# Patient Record
Sex: MALE
[De-identification: names, ages, dates, MRNs, and addresses within clinical notes are randomized per-mention and may not be internally consistent; named-entity substitution may affect disease eponyms.]

---

## 2019-01-10 ENCOUNTER — HOSPITAL ENCOUNTER (EMERGENCY)
Dept: HOSPITAL 97 - ER | Age: 29
Discharge: HOME | End: 2019-01-10
Payer: SELF-PAY

## 2019-01-10 DIAGNOSIS — Z72.0: ICD-10-CM

## 2019-01-10 DIAGNOSIS — K02.7: Primary | ICD-10-CM

## 2019-01-10 PROCEDURE — 99283 EMERGENCY DEPT VISIT LOW MDM: CPT

## 2019-01-10 PROCEDURE — 96372 THER/PROPH/DIAG INJ SC/IM: CPT

## 2019-01-10 NOTE — ER
Nurse's Notes                                                                                     

 [facility]                                                                

Name: Antonio Grijalva Jr                                                                                

Age: 28 yrs                                                                                       

Sex: Male                                                                                         

: 1990                                                                                   

MRN: [id number]                                                                                   

Arrival Date: 01/10/2019                                                                          

Time: 11:37                                                                                       

Account#: [id number]                                                                            

Bed Treatment                                                                                     

Private MD: Cally Cuenca                                                                    

Diagnosis: Dental caries;Dental root caries                                                       

                                                                                                  

Presentation:                                                                                     

01/10                                                                                             

12:07 Presenting complaint: Patient states: pain to left upper jaw earlier this week, face    iw  

      now swollen since last night. Transition of care: patient was not received from another     

      setting of care. Onset of symptoms was January 10, 2019. Risk Assessment: Do you want       

      to hurt yourself or someone else? Patient reports no desire to harm self or others.         

      Initial Sepsis Screen: Does the patient meet any 2 criteria? No. Patient's initial          

      sepsis screen is negative. Does the patient have a suspected source of infection? No.       

      Patient's initial sepsis screen is negative. Care prior to arrival: None.                   

12:07 Method Of Arrival: Ambulatory                                                           iw  

12:07 Acuity: KORIN 4                                                                           iw  

                                                                                                  

Triage Assessment:                                                                                

12:40 General: Appears in no apparent distress. comfortable.                                  iw  

13:20 General: Behavior is calm, cooperative.                                                 iw  

                                                                                                  

Historical:                                                                                       

- Allergies:                                                                                      

12:09 No Known Allergies;                                                                     iw  

- Home Meds:                                                                                      

12:09 None [Active];                                                                          iw  

- PMHx:                                                                                           

12:09 None;                                                                                   iw  

- PSHx:                                                                                           

12:09 None;                                                                                   iw  

                                                                                                  

- Immunization history:: Adult Immunizations not up to date.                                      

- Social history:: Smoking status: Patient uses tobacco products, cigars.                         

- Ebola Screening: : Patient negative for fever greater than or equal to 101.5 degrees            

  Fahrenheit, and additional compatible Ebola Virus Disease symptoms Patient denies               

  exposure to infectious person Patient denies travel to an Ebola-affected area in the            

  21 days before illness onset No symptoms or risks identified at this time.                      

                                                                                                  

                                                                                                  

Screenin:40 Fall Risk None identified.                                                              iw  

12:50 Nutritional screening: No deficits noted.                                               iw  

12:50 Tuberculosis screening: No symptoms or risk factors identified.                         iw  

13:20 Abuse screen: Denies threats or abuse. Denies injuries from another.                    iw  

                                                                                                  

Assessment:                                                                                       

12:40 General: Appears in no apparent distress. Behavior is calm, cooperative. Pain:          iw  

      Complains of pain in face and upper left first bicuspid and mouth and left cheek.           

      Neuro: Level of Consciousness is awake, alert, obeys commands, Moves all extremities.       

      Full function. Cardiovascular: Capillary refill < 3 seconds in bilateral fingers            

      Patient's skin is warm and dry. Respiratory: Respiratory effort is even, unlabored.         

      Derm: Skin is intact, is healthy with good turgor. Musculoskeletal: Range of motion:        

      intact in all extremities.                                                                  

                                                                                                  

Vital Signs:                                                                                      

12:10  / 71; Pulse 74; Resp 16; Pulse Ox 99% on R/A; Weight 106.59 kg; Height 5 ft. 8   iw  

      in. (172.72 cm); Pain 10/10;                                                                

12:10 Body Mass Index 35.73 (106.59 kg, 172.72 cm)                                            iw  

                                                                                                  

ED Course:                                                                                        

11:37 Patient arrived in ED.                                                                  mr  

11:37 Cally Cuenca is Private Physician.                                                mr  

11:49 Patient's name was called from ER lobby. No response.                                   sg  

12:01 Patient's name was called from ER lobby. No response. Unable to locate patient. Will    sg  

      disposition as left without being seen by a provider.                                       

12:08 Triage completed.                                                                       iw  

12:10 Kayleen Pena, RN is Primary Nurse.                                                   iw  

12:12 Marcio Monge MD is Attending Physician.                                             lucian 

12:40 Arm band placed on.                                                                     iw  

12:40 Patient has correct armband on for positive identification.                             iw  

13:00 Cally Cuenca is Referral Physician.                                               lucian 

13:01 Dank Moss DDS is Referral Physician.                                             lucian 

13:46 Patient did not have IV access during this emergency room visit.                        iw  

13:48 No provider procedures requiring assistance completed.                                  iw  

                                                                                                  

Administered Medications:                                                                         

13:30 Drug: Clindamycin 600 mg Route: IM; Site: left deltoid;                                 iw  

13:50 Follow up: Response: No adverse reaction                                                iw  

13:40 Drug: Clindamycin 300 mg Route: PO;                                                     iw  

13:50 Follow up: Response: No adverse reaction                                                iw  

13:48 Drug: Motrin 800 mg Route: PO;                                                          iw  

13:58 Follow up: Response: No adverse reaction                                                iw  

                                                                                                  

                                                                                                  

Outcome:                                                                                          

13:02 Discharge ordered by MD.                                                                lucian 

13:47 Discharged to home ambulatory, with family.                                             iw  

13:47 Condition: good                                                                             

13:47 Discharge instructions given to patient, family, Instructed on discharge instructions,      

      follow up and referral plans. medication usage, Demonstrated understanding of               

      instructions, follow-up care, medications, Prescriptions given X 2.                         

13:48 Patient left the ED.                                                                    iw  

                                                                                                  

Signatures:                                                                                       

Theo Rosas RN RN sg Anderson, Corey, MD MD [facility]                                 mr                                                   

Kayleen Pena RN RN   iw                                                   

                                                                                                  

Corrections: (The following items were deleted from the chart)                                    

12:10 12:07 Presenting complaint: Patient states: pain to right upper jaw earlier this week,  iw  

      face now swollen since last night iw                                                        

                                                                                                  

**************************************************************************************************

## 2019-01-10 NOTE — EDPHYS
Physician Documentation                                                                           

 [facility]                                                                

Name: Antonio Grijalva Jr                                                                                

Age: 28 yrs                                                                                       

Sex: Male                                                                                         

: 1990                                                                                   

MRN: [id number]                                                                                   

Arrival Date: 01/10/2019                                                                          

Time: 11:37                                                                                       

Account#: [id number]                                                                            

Bed Treatment                                                                                     

Private MD: Cally Cuenca ED Physician Marcio Monge                                                                      

HPI:                                                                                              

01/10                                                                                             

12:56 This 28 yrs old  Male presents to ER via Ambulatory with complaints of Facial   lucian 

      Swelling.                                                                                   

12:56 The patient or guardian reports pain, swelling. The complaints affect the left cheek.   lucian 

      Context of injury: The problem was sustained at an unknown location. Onset: The             

      symptoms/episode began/occurred 2 day(s) ago. Associated signs and symptoms: The            

      patient has no apparent associated signs or symptoms. The patient presents with pain,       

      redness. The problem is located in the upper left first bicuspid. Severity of symptoms:     

      At their worst the symptoms were mild, moderate, in the emergency department the            

      symptoms are unchanged.                                                                     

                                                                                                  

Historical:                                                                                       

- Allergies:                                                                                      

12:09 No Known Allergies;                                                                     iw  

- Home Meds:                                                                                      

12:09 None [Active];                                                                          iw  

- PMHx:                                                                                           

12:09 None;                                                                                   iw  

- PSHx:                                                                                           

12:09 None;                                                                                   iw  

                                                                                                  

- Immunization history:: Adult Immunizations not up to date.                                      

- Social history:: Smoking status: Patient uses tobacco products, cigars.                         

- Ebola Screening: : Patient negative for fever greater than or equal to 101.5 degrees            

  Fahrenheit, and additional compatible Ebola Virus Disease symptoms Patient denies               

  exposure to infectious person Patient denies travel to an Ebola-affected area in the            

  21 days before illness onset No symptoms or risks identified at this time.                      

                                                                                                  

                                                                                                  

ROS:                                                                                              

12:56 Constitutional: Negative for fever, chills, and weight loss, Eyes: Negative for injury, lucian 

      pain, redness, and discharge, Neck: Negative for injury, pain, and swelling,                

      Cardiovascular: Negative for chest pain, palpitations, and edema, Respiratory: Negative     

      for shortness of breath, cough, wheezing, and pleuritic chest pain, Abdomen/GI:             

      Negative for abdominal pain, nausea, vomiting, diarrhea, and constipation, Back:            

      Negative for injury and pain, : Negative for injury, bleeding, discharge, and             

      swelling, MS/Extremity: Negative for injury and deformity, Skin: Negative for injury,       

      rash, and discoloration, Neuro: Negative for headache, weakness, numbness, tingling,        

      and seizure, Psych: Negative for depression, anxiety, suicide ideation, homicidal           

      ideation, and hallucinations, Allergy/Immunology: Negative for hives, rash, and             

      allergies, Endocrine: Negative for neck swelling, polydipsia, polyuria, polyphagia, and     

      marked weight changes, Hematologic/Lymphatic: Negative for swollen nodes, abnormal          

      bleeding, and unusual bruising.                                                             

12:56 ENT: Positive for Gum pain Teeth pain                                                       

                                                                                                  

Exam:                                                                                             

12:56 Constitutional:  This is a well developed, well nourished patient who is awake, alert,  lucian 

      and in no acute distress. ENT:  Nares patent. No nasal discharge, no septal                 

      abnormalities noted.  Tympanic membranes are normal and external auditory canals are        

      clear.  Oropharynx with no redness, swelling, or masses, exudates, or evidence of           

      obstruction, uvula midline.  Mucous membranes moist. Neck:  Trachea midline, no             

      thyromegaly or masses palpated, and no cervical lymphadenopathy.  Supple, full range of     

      motion without nuchal rigidity, or vertebral point tenderness.  No Meningismus.             

      Chest/axilla:  Normal chest wall appearance and motion.  Nontender with no deformity.       

      No lesions are appreciated. Cardiovascular:  Regular rate and rhythm with a normal S1       

      and S2.  No gallops, murmurs, or rubs.  Normal PMI, no JVD.  No pulse deficits.             

      Respiratory:  Lungs have equal breath sounds bilaterally, clear to auscultation and         

      percussion.  No rales, rhonchi or wheezes noted.  No increased work of breathing, no        

      retractions or nasal flaring. Abdomen/GI:  Soft, non-tender, with normal bowel sounds.      

      No distension or tympany.  No guarding or rebound.  No evidence of tenderness               

      throughout. Back:  No spinal tenderness.  No costovertebral tenderness.  Full range of      

      motion. Male :  Normal genitalia with no discharge or lesions. Skin:  Warm, dry with      

      normal turgor.  Normal color with no rashes, no lesions, and no evidence of cellulitis.     

      MS/ Extremity:  Pulses equal, no cyanosis.  Neurovascular intact.  Full, normal range       

      of motion. Neuro:  Awake and alert, GCS 15, oriented to person, place, time, and            

      situation.  Cranial nerves II-XII grossly intact.  Motor strength 5/5 in all                

      extremities.  Sensory grossly intact.  Cerebellar exam normal.  Normal gait. Psych:         

      Awake, alert, with orientation to person, place and time.  Behavior, mood, and affect       

      are within normal limits.                                                                   

12:56 Head/face: Noted is erythema, swelling, that is mild, that is moderate, of the  left        

      cheek.                                                                                      

                                                                                                  

Vital Signs:                                                                                      

12:10  / 71; Pulse 74; Resp 16; Pulse Ox 99% on R/A; Weight 106.59 kg; Height 5 ft. 8   iw  

      in. (172.72 cm); Pain 10/10;                                                                

12:10 Body Mass Index 35.73 (106.59 kg, 172.72 cm)                                              

                                                                                                  

MDM:                                                                                              

12:12 Patient medically screened.                                                             [facility] 

12:58 Data reviewed: vital signs, nurses notes.                                               [facility] 

                                                                                                  

Administered Medications:                                                                         

13:30 Drug: Clindamycin 600 mg Route: IM; Site: left deltoid;                                 iw  

13:50 Follow up: Response: No adverse reaction                                                iw  

13:40 Drug: Clindamycin 300 mg Route: PO;                                                     iw  

13:50 Follow up: Response: No adverse reaction                                                iw  

13:48 Drug: Motrin 800 mg Route: PO;                                                          iw  

13:58 Follow up: Response: No adverse reaction                                                  

                                                                                                  

                                                                                                  

Disposition:                                                                                      

01/10/19 13:02 Discharged to Home. Impression: Dental caries, Dental root caries.                 

- Condition is Stable.                                                                            

- Discharge Instructions: Dental Abscess, Dental Caries, Adult, Dental Pain, Dental               

  Pain, Easy-to-Read.                                                                             

- Prescriptions for Clindamycin HCl 300 mg Oral Capsule - take 1 capsule by ORAL route            

  every 6 hours for 10 days; 40 capsule. Ibuprofen 600 mg Oral Tablet - take 1 tablet             

  by ORAL route every 8 hours As needed take with food; 21 tablet.                                

- Medication Reconciliation Form, Thank You Letter, Antibiotic Education, Prescription            

  Opioid Use form.                                                                                

- Follow up: Cally Cuenca; When: 2 - 3 days; Reason: Recheck today's complaints,            

  Continuance of care, Re-evaluation by your physician. Follow up: Dank Moss DDS; When: 2 - 3 days; Reason: Recheck today's complaints, Re-evaluation by your                

  physician.                                                                                      

- Problem is new.                                                                                 

- Symptoms have improved.                                                                         

                                                                                                  

                                                                                                  

                                                                                                  

Signatures:                                                                                       

Marcio Monge MD MD cha Williams, Irene RN                     RN   iw                                                   

                                                                                                  

Corrections: (The following items were deleted from the chart)                                    

13:48 13:02 01/10/2019 13:02 Discharged to Home. Impression: Dental caries; Dental root       iw  

      caries. Condition is Stable. Forms are Medication Reconciliation Form, Thank You            

      Letter, Antibiotic Education, Prescription Opioid Use. Follow up: Cally Cuenca;        

      When: 2 - 3 days; Reason: Recheck today's complaints, Continuance of care,                  

      Re-evaluation by your physician. Follow up: Dank Moss; When: 2 - 3 days; Reason:       

      Recheck today's complaints, Re-evaluation by your physician. Problem is new. Symptoms       

      have improved. lucian                                                                          

                                                                                                  

**************************************************************************************************